# Patient Record
Sex: MALE
[De-identification: names, ages, dates, MRNs, and addresses within clinical notes are randomized per-mention and may not be internally consistent; named-entity substitution may affect disease eponyms.]

---

## 2019-03-26 PROBLEM — Z00.00 ENCOUNTER FOR PREVENTIVE HEALTH EXAMINATION: Status: ACTIVE | Noted: 2019-03-26

## 2019-03-27 ENCOUNTER — LABORATORY RESULT (OUTPATIENT)
Age: 34
End: 2019-03-27

## 2019-03-27 ENCOUNTER — APPOINTMENT (OUTPATIENT)
Dept: ENDOCRINOLOGY | Facility: CLINIC | Age: 34
End: 2019-03-27
Payer: COMMERCIAL

## 2019-03-27 VITALS
WEIGHT: 315 LBS | SYSTOLIC BLOOD PRESSURE: 169 MMHG | DIASTOLIC BLOOD PRESSURE: 98 MMHG | HEIGHT: 71 IN | BODY MASS INDEX: 44.1 KG/M2 | TEMPERATURE: 98 F

## 2019-03-27 PROCEDURE — 99205 OFFICE O/P NEW HI 60 MIN: CPT

## 2019-03-27 RX ORDER — LANCETS 28 GAUGE
EACH MISCELLANEOUS
Qty: 100 | Refills: 2 | Status: ACTIVE | COMMUNITY
Start: 2019-03-27 | End: 1900-01-01

## 2019-03-27 RX ORDER — PEN NEEDLE, DIABETIC 29 G X1/2"
31G X 8 MM NEEDLE, DISPOSABLE MISCELLANEOUS
Qty: 100 | Refills: 1 | Status: ACTIVE | COMMUNITY
Start: 2019-03-27 | End: 1900-01-01

## 2019-03-27 RX ORDER — BLOOD-GLUCOSE METER
KIT MISCELLANEOUS
Qty: 1 | Refills: 0 | Status: ACTIVE | COMMUNITY
Start: 2019-03-27 | End: 1900-01-01

## 2019-03-27 RX ORDER — BLOOD SUGAR DIAGNOSTIC
STRIP MISCELLANEOUS
Qty: 100 | Refills: 3 | Status: ACTIVE | COMMUNITY
Start: 2019-03-27 | End: 1900-01-01

## 2019-03-27 RX ORDER — INSULIN DEGLUDEC INJECTION 100 U/ML
100 INJECTION, SOLUTION SUBCUTANEOUS
Qty: 2 | Refills: 2 | Status: ACTIVE | COMMUNITY
Start: 2019-03-27 | End: 1900-01-01

## 2019-03-27 RX ORDER — ISOPROPYL ALCOHOL 0.7 ML/ML
SWAB TOPICAL
Qty: 1 | Refills: 1 | Status: ACTIVE | COMMUNITY
Start: 2019-03-27 | End: 1900-01-01

## 2019-03-27 NOTE — REVIEW OF SYSTEMS
[Recent Weight Loss (___ Lbs)] : recent [unfilled] ~Ulb weight loss [Blurry Vision] : blurred vision [Polydipsia] : polydipsia [Negative] : Heme/Lymph [de-identified] : tingling to extremities

## 2019-03-27 NOTE — END OF VISIT
[FreeTextEntry3] : All medical record entries made by the Scribe were at my, Dr. Ed Gutierres, direction and personally dictated by me on 03/27/2019. I have reviewed the chart and agree that the record accurately reflects my personal performance of the history, physical exam, assessment and plan. I have also personally directed, reviewed and agreed with the chart.  [>50% of Time Spent on Counseling for ____] : Greater than 50% of the encounter time was spent on counseling for [unfilled] [Time Spent: ___ minutes] : I have spent [unfilled] minutes of face to face time with the patient

## 2019-03-27 NOTE — ADDENDUM
[FreeTextEntry1] : I, Ilya Olivares, acted soley as a scribe for Dr. Ed Gutierres on this date. 03/27/2019.

## 2019-03-27 NOTE — HISTORY OF PRESENT ILLNESS
[FreeTextEntry1] : 35 y/o M pt, /98, BMI 44.35, with a Hx of DM (dx x2 weeks ago in the ED, started on Metformin) presents to establish endocrine care x today. Other PMHx: sleep apnea. Alleriges: Penicillin. FHx: lupus (mother). Social EtOH. No tobacco except hookah. Walks ~1 mile a day.\par \par Pt states that a little over 2 weeks ago, he was experiencing symptoms of excessive thirst and weight loss (20 lbs) with some tingling sensation to extremities. Pt went to [facility], and his blood sugar levels were found to be 952. Pt was given insulin at the hospital, and d/c on 500mg Metformin QD. Pt experienced an episode of blurry vision after his d/c from the hospital. Pt denies GI disturbances, chest pain, palpitations, nocturia. \par \par Current Medications: Metformin 500mg QD

## 2019-03-27 NOTE — ASSESSMENT
[FreeTextEntry1] : 37 y/o M pt with a Hx of \par 1. new onset of DM, seen in the ED with sugars higher than 900s; received hydration and insulin injections. Pt was d/c on 500 mg Metformin. \par Pt with classical symptoms of osmotic diuresis. Sugars today 352.\par DM treatment goals discussed.\par Asses for diabetes related complications\par Sending labs including urine, Artemio ab ( consider [place] diabetes)\par  nitiation of insulin therapy, Tresiba 45u QD and increase Metformin to 1g BID.\par 2. HTN. Recommend ACE's, after labs review\par Appointment with NP for diabetes comprehensive teaching and initiation of insulin inj, assess statins initiation\par Return to see me in 4 weeks\par \par \par Return in 2 weeks. [Hypoglycemia Management] : hypoglycemia management [Long Term Vascular Complications] : long term vascular complications of diabetes [Importance of Diet and Exercise] : importance of diet and exercise to improve glycemic control, achieve weight loss and improve cardiovascular health [Self Monitoring of Blood Glucose] : self monitoring of blood glucose

## 2019-03-27 NOTE — PHYSICAL EXAM
[Alert] : alert [Normal Sclera/Conjunctiva] : normal sclera/conjunctiva [Normal Outer Ear/Nose] : the ears and nose were normal in appearance [No Neck Mass] : no neck mass was observed [Thyroid Not Enlarged] : the thyroid was not enlarged [No Thyroid Nodules] : there were no palpable thyroid nodules [No Respiratory Distress] : no respiratory distress [Normal Rate and Effort] : normal respiratory rhythm and effort [Normal Rate] : heart rate was normal  [Normal Bowel Sounds] : normal bowel sounds [Spine Straight] : spine straight [No Stigmata of Cushings Syndrome] : no stigmata of cushings syndrome [Normal Gait] : normal gait [No Rash] : no rash [Right Foot Was Examined] : right foot ~C was examined [Left Foot Was Examined] : left foot ~C was examined [Swelling] : swollen [1+] : 1+ in the dorsalis pedis [Normal Reflexes] : deep tendon reflexes were 2+ and symmetric [Oriented x3] : oriented to person, place, and time [Foot Ulcers] : no foot ulcers [Vibration Dec.] : normal vibratory sensation at the level of the toes [de-identified] : 2+ pitting edema, 1+ DP pulses  [FreeTextEntry1] : dry skin  [FreeTextEntry5] : dry skin

## 2019-03-28 ENCOUNTER — APPOINTMENT (OUTPATIENT)
Dept: ENDOCRINOLOGY | Facility: CLINIC | Age: 34
End: 2019-03-28
Payer: COMMERCIAL

## 2019-03-28 VITALS
SYSTOLIC BLOOD PRESSURE: 141 MMHG | BODY MASS INDEX: 43.68 KG/M2 | HEIGHT: 71 IN | WEIGHT: 312 LBS | HEART RATE: 81 BPM | DIASTOLIC BLOOD PRESSURE: 94 MMHG

## 2019-03-28 DIAGNOSIS — F17.290 NICOTINE DEPENDENCE, OTHER TOBACCO PRODUCT, UNCOMPLICATED: ICD-10-CM

## 2019-03-28 DIAGNOSIS — I10 ESSENTIAL (PRIMARY) HYPERTENSION: ICD-10-CM

## 2019-03-28 DIAGNOSIS — F19.90 OTHER PSYCHOACTIVE SUBSTANCE USE, UNSPECIFIED, UNCOMPLICATED: ICD-10-CM

## 2019-03-28 DIAGNOSIS — Z86.69 PERSONAL HISTORY OF OTHER DISEASES OF THE NERVOUS SYSTEM AND SENSE ORGANS: ICD-10-CM

## 2019-03-28 DIAGNOSIS — E11.65 TYPE 2 DIABETES MELLITUS WITH HYPERGLYCEMIA: ICD-10-CM

## 2019-03-28 DIAGNOSIS — F17.200 NICOTINE DEPENDENCE, UNSPECIFIED, UNCOMPLICATED: ICD-10-CM

## 2019-03-28 DIAGNOSIS — Z83.79 FAMILY HISTORY OF OTHER DISEASES OF THE DIGESTIVE SYSTEM: ICD-10-CM

## 2019-03-28 LAB — GLUCOSE BLDC GLUCOMTR-MCNC: 346

## 2019-03-28 PROCEDURE — 97802 MEDICAL NUTRITION INDIV IN: CPT

## 2019-03-28 PROCEDURE — 99215 OFFICE O/P EST HI 40 MIN: CPT | Mod: 25

## 2019-03-28 PROCEDURE — 82962 GLUCOSE BLOOD TEST: CPT

## 2019-03-28 RX ORDER — CONTAINER,EMPTY
EACH MISCELLANEOUS
Qty: 2 | Refills: 10 | Status: ACTIVE | COMMUNITY
Start: 2019-03-28 | End: 1900-01-01

## 2019-03-28 RX ORDER — METFORMIN HYDROCHLORIDE 500 MG/1
500 TABLET, COATED ORAL
Refills: 0 | Status: COMPLETED | COMMUNITY

## 2019-03-28 NOTE — ASSESSMENT
[FreeTextEntry1] : 37 y/o M pt with recent diagnosis of DM \par PMH: HTN \par BMI 43.5\par BP: 141/94\par  new onset of DM, seen in the ED with sugars higher than 900s; received hydration and insulin injections.\par Sugars today 346.\par DM treatment goals discussed.\par Diabetes related complications reviewed and discussed in detail \par Labs reviewed \par Pt would like to wait to start statin \par Amendable to seeing RD and Optho, has own podiatrist which he will follow-up with. \par \par Educated pt on Insulin administration and uses.  Used teach back method to ensure understanding and ability to inject. \par educated on use of glucometer and all its parts - teach back method used \par Educated on modification of diet and lifestyle- small frequent meals and decrease portions sizes and CHO intake. Discussed the importance of exercise ( wait to start until BG better under control avoid dehydration) \par Plate method reviewed\par Referred to RD \par referred to Optho\par \par Discussed importance of BP control, decreasing sodium intake and reading labels \par Written material provided \par Diabetic treatment plan written down for patient \par Pt to keep log of BG and bring to next visit \par Advised to call office if BG remain > 300 for 2 days in a row \par Advised to start insulin today (sample tresiba given)\par \par Return in 2 weeks with Dr. Gutierres  [Carbohydrate Consistent Diet] : carbohydrate consistent diet [Hypoglycemia Management] : hypoglycemia management [Sick-Day Management] : sick-day management [Diabetes Foot Care] : diabetes foot care [Long Term Vascular Complications] : long term vascular complications of diabetes [Importance of Diet and Exercise] : importance of diet and exercise to improve glycemic control, achieve weight loss and improve cardiovascular health [Smoking Cessation] : smoking cessation [Action and use of Insulin] : action and use of short and long-acting insulin [Self Monitoring of Blood Glucose] : self monitoring of blood glucose [Insulin Self-Administration] : insulin self-administration [Injection Technique, Storage, Sharps Disposal] : injection technique, storage, and sharps disposal

## 2019-04-01 RX ORDER — INSULIN DEGLUDEC INJECTION 100 U/ML
100 INJECTION, SOLUTION SUBCUTANEOUS
Qty: 1 | Refills: 0 | Status: ACTIVE | OUTPATIENT
Start: 2019-03-28

## 2019-04-10 RX ORDER — INSULIN ASPART 100 [IU]/ML
100 INJECTION, SOLUTION INTRAVENOUS; SUBCUTANEOUS
Qty: 1 | Refills: 0 | Status: ACTIVE | COMMUNITY
Start: 2019-04-10 | End: 1900-01-01

## 2019-08-14 ENCOUNTER — OTHER (OUTPATIENT)
Age: 34
End: 2019-08-14

## 2019-08-14 RX ORDER — METFORMIN HYDROCHLORIDE 1000 MG/1
1000 TABLET, COATED ORAL
Qty: 60 | Refills: 6 | Status: ACTIVE | COMMUNITY
Start: 2019-03-27 | End: 1900-01-01